# Patient Record
Sex: FEMALE | Race: OTHER | HISPANIC OR LATINO | Employment: UNEMPLOYED | URBAN - METROPOLITAN AREA
[De-identification: names, ages, dates, MRNs, and addresses within clinical notes are randomized per-mention and may not be internally consistent; named-entity substitution may affect disease eponyms.]

---

## 2023-02-07 DIAGNOSIS — R93.89 ABNORMAL FINDING ON ULTRASOUND: Primary | ICD-10-CM

## 2023-02-15 ENCOUNTER — OFFICE VISIT (OUTPATIENT)
Dept: MATERNAL FETAL MEDICINE | Facility: CLINIC | Age: 28
End: 2023-02-15

## 2023-02-15 ENCOUNTER — PROCEDURE VISIT (OUTPATIENT)
Dept: MATERNAL FETAL MEDICINE | Facility: CLINIC | Age: 28
End: 2023-02-15

## 2023-02-15 VITALS — SYSTOLIC BLOOD PRESSURE: 108 MMHG | BODY MASS INDEX: 27.98 KG/M2 | WEIGHT: 153 LBS | DIASTOLIC BLOOD PRESSURE: 74 MMHG

## 2023-02-15 DIAGNOSIS — R93.89 ABNORMAL FINDING ON ULTRASOUND: ICD-10-CM

## 2023-02-15 DIAGNOSIS — O35.9XX0 SUSPECTED FETAL ABNORMALITY AFFECTING MANAGEMENT OF MOTHER, SINGLE GESTATION: ICD-10-CM

## 2023-02-15 PROCEDURE — 76816 OB US FOLLOW-UP PER FETUS: CPT | Mod: 26,S$PBB,, | Performed by: OBSTETRICS & GYNECOLOGY

## 2023-02-15 PROCEDURE — 76377 3D RENDER W/INTRP POSTPROCES: CPT | Mod: PBBFAC | Performed by: OBSTETRICS & GYNECOLOGY

## 2023-02-15 PROCEDURE — 99214 OFFICE O/P EST MOD 30 MIN: CPT | Mod: 25,S$PBB,, | Performed by: OBSTETRICS & GYNECOLOGY

## 2023-02-15 PROCEDURE — 76816 OB US FOLLOW-UP PER FETUS: CPT | Mod: PBBFAC | Performed by: OBSTETRICS & GYNECOLOGY

## 2023-02-15 PROCEDURE — 76816 PR  US,PREGNANT UTERUS,F/U,TRANSABD APP: ICD-10-PCS | Mod: 26,S$PBB,, | Performed by: OBSTETRICS & GYNECOLOGY

## 2023-02-15 PROCEDURE — 99214 PR OFFICE/OUTPT VISIT, EST, LEVL IV, 30-39 MIN: ICD-10-PCS | Mod: 25,S$PBB,, | Performed by: OBSTETRICS & GYNECOLOGY

## 2023-02-15 PROCEDURE — 76377 3D RENDER W/INTRP POSTPROCES: CPT | Mod: 26,S$PBB,, | Performed by: OBSTETRICS & GYNECOLOGY

## 2023-02-15 PROCEDURE — 99999 PR PBB SHADOW E&M-EST. PATIENT-LVL II: CPT | Mod: PBBFAC,,, | Performed by: OBSTETRICS & GYNECOLOGY

## 2023-02-15 PROCEDURE — 76377 PR  3D RENDERING W/ IMAGE POSTPROCESS: ICD-10-PCS | Mod: 26,S$PBB,, | Performed by: OBSTETRICS & GYNECOLOGY

## 2023-02-15 PROCEDURE — 99999 PR PBB SHADOW E&M-EST. PATIENT-LVL II: ICD-10-PCS | Mod: PBBFAC,,, | Performed by: OBSTETRICS & GYNECOLOGY

## 2023-02-15 PROCEDURE — 99212 OFFICE O/P EST SF 10 MIN: CPT | Mod: PBBFAC | Performed by: OBSTETRICS & GYNECOLOGY

## 2023-02-15 NOTE — PROGRESS NOTES
Consultation  ==========    35 minutes of total time was spent on the encounter which included face-to-face time and non-face-to-face time preparing to see the patient,  obtaining and or reviewing separately obtained history, documenting clinical information in the electronic or other health record, independently  interpreting results (not separately reported) and communicating results to the patient and her cousin, or care coordination (not separately  reported). The consultation was conducted with the assistance of an onsite  (Manisha).    Referring MD: Dr. Joe    Indication for consultation: possible fetal facial mass    On the ultrasound exam from 2/7/23, interpreted by Dr. Mcgrath, a possible mass was seen near the fetal nose. Imaging was not optimal  however. The patient presents today for 3D assessment of the face and for completion of the anatomic survey.  Please see prior consultation note from Dr. Mcgrath for full details on the patient's medical, family, and OB hx.    On ultrasound today, imaging of the fetal face by 2D and 3D imaging is good. There is no evidence of a mass near the nose or any other facial  abnormality. The remainder of the anatomic survey (with the exception of the ductal arch) was completed today, and no abnormalities were  seen.  AFV is normal.    Given the reassuring findings from today's exam, no further f/u with MFM has been scheduled.    Ultrasound Impression  =========    1. IUP - 23 and 2/7 weeks  2. No fetal facial mass/abnormality seen    Recommendation  ==============    Repeat ultrasound study as clinically indicated

## 2023-06-07 PROBLEM — Z37.9 NORMAL LABOR: Status: ACTIVE | Noted: 2023-06-07
